# Patient Record
(demographics unavailable — no encounter records)

---

## 2024-12-09 NOTE — PHYSICAL EXAM
[Normal] : supple, no neck mass and thyroid not enlarged [Normal Supraclavicular Lymph Nodes] : normal supraclavicular lymph nodes [Normal Axillary Lymph Nodes] : normal axillary lymph nodes [Normal] : oriented to person, place and time, with appropriate affect [de-identified] : S/P right mastectomy with implant reconstruction without local recurrence; left breast negative

## 2024-12-09 NOTE — HISTORY OF PRESENT ILLNESS
[de-identified] : Ms. KINDRA ROSARIO is a 76-year-old woman here for a follow-up visit   Past Hx:  s/p RIGHT Mastectomy & right axillary sentinel lymph node dissection on April 8, 2015 with RIGHT implant (Dr. Toribio), path revealed invasive well-differentiated ductal carcinoma; ER/IA +; HER-2 -; patient currently on Anastrozole 1 mg PO daily with no complaints.  BRCA (-).  Left mammo 10/2024 - BIRADS 1  labs 10/2024 - WNL   Denies LEFT breast pain, palpable asses and/or nipple discharge; denies RIGHT chest wall discomfort and/or skin changes.

## 2024-12-09 NOTE — HISTORY OF PRESENT ILLNESS
[de-identified] : Ms. KINDRA ROSARIO is a 76-year-old woman here for a follow-up visit   Past Hx:  s/p RIGHT Mastectomy & right axillary sentinel lymph node dissection on April 8, 2015 with RIGHT implant (Dr. Toribio), path revealed invasive well-differentiated ductal carcinoma; ER/AL +; HER-2 -; patient currently on Anastrozole 1 mg PO daily with no complaints.  BRCA (-).  Left mammo 10/2024 - BIRADS 1  labs 10/2024 - WNL   Denies LEFT breast pain, palpable asses and/or nipple discharge; denies RIGHT chest wall discomfort and/or skin changes.

## 2024-12-09 NOTE — ASSESSMENT
[FreeTextEntry1] : IMP:   hx right breast invasive ductal carcinoma s/p mastectomy w/ reconstruction 2015 On Arimidex 1 mg daily  Left mammo 10/2024 - BIRADS 1  labs 10/2024 - WNL    Plan: Left mammo 10/2025 RTO Q6 months w/ labs

## 2024-12-09 NOTE — PHYSICAL EXAM
[Normal] : supple, no neck mass and thyroid not enlarged [Normal Supraclavicular Lymph Nodes] : normal supraclavicular lymph nodes [Normal Axillary Lymph Nodes] : normal axillary lymph nodes [Normal] : oriented to person, place and time, with appropriate affect [de-identified] : S/P right mastectomy with implant reconstruction without local recurrence; left breast negative

## 2025-06-10 NOTE — PHYSICAL EXAM
[Normal] : supple, no neck mass and thyroid not enlarged [Normal Supraclavicular Lymph Nodes] : normal supraclavicular lymph nodes [Normal Axillary Lymph Nodes] : normal axillary lymph nodes [Normal] : oriented to person, place and time, with appropriate affect [de-identified] : S/P right mastectomy with implant reconstruction without local recurrence; left breast negative

## 2025-06-10 NOTE — ASSESSMENT
[FreeTextEntry1] : IMP:   hx right breast invasive ductal carcinoma s/p mastectomy w/ reconstruction 2015 On Arimidex 1 mg daily  Left mammo 10/2024 - BIRADS 1  labs 10/2024 - WNL   Plan: labs today  Left mammo 10/2025 RTO Q6 months w/ labs

## 2025-06-10 NOTE — HISTORY OF PRESENT ILLNESS
[de-identified] : Ms. KINDRA ROSARIO is a 77-year-old woman here for a follow-up visit   Past Hx:  s/p RIGHT Mastectomy & right axillary sentinel lymph node dissection on April 8, 2015 with RIGHT implant (Dr. Toribio), path revealed invasive well-differentiated ductal carcinoma; ER/ID +; HER-2 -; patient currently on Anastrozole 1 mg PO daily with no complaints.  BRCA (-).  Left mammo 10/2024 - BIRADS 1  labs 10/2024 - WNL   Denies LEFT breast pain, palpable asses and/or nipple discharge; denies RIGHT chest wall discomfort and/or skin changes.

## 2025-06-10 NOTE — PHYSICAL EXAM
[Normal] : supple, no neck mass and thyroid not enlarged [Normal Supraclavicular Lymph Nodes] : normal supraclavicular lymph nodes [Normal Axillary Lymph Nodes] : normal axillary lymph nodes [Normal] : oriented to person, place and time, with appropriate affect [de-identified] : S/P right mastectomy with implant reconstruction without local recurrence; left breast negative

## 2025-06-10 NOTE — HISTORY OF PRESENT ILLNESS
[de-identified] : Ms. KINDRA ROSARIO is a 77-year-old woman here for a follow-up visit   Past Hx:  s/p RIGHT Mastectomy & right axillary sentinel lymph node dissection on April 8, 2015 with RIGHT implant (Dr. Toribio), path revealed invasive well-differentiated ductal carcinoma; ER/OK +; HER-2 -; patient currently on Anastrozole 1 mg PO daily with no complaints.  BRCA (-).  Left mammo 10/2024 - BIRADS 1  labs 10/2024 - WNL   Denies LEFT breast pain, palpable asses and/or nipple discharge; denies RIGHT chest wall discomfort and/or skin changes.